# Patient Record
Sex: FEMALE | Race: WHITE | Employment: STUDENT
[De-identification: names, ages, dates, MRNs, and addresses within clinical notes are randomized per-mention and may not be internally consistent; named-entity substitution may affect disease eponyms.]

---

## 2019-07-22 ENCOUNTER — RECORDS - HEALTHEAST (OUTPATIENT)
Dept: ADMINISTRATIVE | Facility: OTHER | Age: 24
End: 2019-07-22

## 2019-07-23 ENCOUNTER — RECORDS - HEALTHEAST (OUTPATIENT)
Dept: ADMINISTRATIVE | Facility: OTHER | Age: 24
End: 2019-07-23

## 2019-08-10 ENCOUNTER — RECORDS - HEALTHEAST (OUTPATIENT)
Dept: ADMINISTRATIVE | Facility: OTHER | Age: 24
End: 2019-08-10

## 2019-08-12 ENCOUNTER — OFFICE VISIT - HEALTHEAST (OUTPATIENT)
Dept: ALLERGY | Facility: CLINIC | Age: 24
End: 2019-08-12

## 2019-08-12 DIAGNOSIS — T78.3XXD ANGIOEDEMA, SUBSEQUENT ENCOUNTER: ICD-10-CM

## 2019-08-12 DIAGNOSIS — L50.9 HIVES: ICD-10-CM

## 2019-08-12 LAB — TSH SERPL DL<=0.005 MIU/L-ACNC: 1.12 UIU/ML (ref 0.3–5)

## 2019-08-12 RX ORDER — EPINEPHRINE 0.3 MG/.3ML
INJECTION SUBCUTANEOUS
Refills: 2 | Status: SHIPPED | COMMUNITY
Start: 2019-07-22

## 2019-08-12 ASSESSMENT — MIFFLIN-ST. JEOR: SCORE: 1493.85

## 2019-08-13 LAB — 25(OH)D3 SERPL-MCNC: 41.5 NG/ML (ref 30–80)

## 2019-08-14 LAB — ANA SER QL: 0.3 U

## 2019-08-16 LAB
C1INH ACT/NOR SERPL: 84 %
C1INH SERPL-MCNC: 27 MG/DL (ref 21–39)

## 2019-08-19 ENCOUNTER — COMMUNICATION - HEALTHEAST (OUTPATIENT)
Dept: ALLERGY | Facility: CLINIC | Age: 24
End: 2019-08-19

## 2020-05-08 ENCOUNTER — NURSE TRIAGE (OUTPATIENT)
Dept: NURSING | Facility: CLINIC | Age: 25
End: 2020-05-08

## 2020-05-08 DIAGNOSIS — Z11.59 SCREENING FOR VIRAL DISEASE: Primary | ICD-10-CM

## 2020-05-08 NOTE — TELEPHONE ENCOUNTER
"Triage call:     Patient is calling requesting COVID serologic antibody testing.  NOTE: Serologic testing is a blood test for 'antibodies' which are made at 10-14 days after you have had symptoms of COVID or were exposed and had an asymptomatic infection.  This does NOT test you for 'active' infection or tell you if you are contagious.    Are you a healthcare worker?  No  Do you have cough, fever, myalgias, or shortness of breath?  No  Were you exposed to a lab confirmed positive or possible case of COVID-19?  No exposure.    The patient was informed: \"Testing is limited each day and it may take time for testing to be available to everyone who has called.  We will be calling you to schedule testing- please confirm the best number to reach you is [phone].\"    Lab order placed per COVID Serologic Testing standing orders.    Radha Leon RN BSN 5/8/2020 1:36 PM          "

## 2020-05-15 DIAGNOSIS — Z11.59 SCREENING FOR VIRAL DISEASE: ICD-10-CM

## 2020-05-15 PROCEDURE — 86769 SARS-COV-2 COVID-19 ANTIBODY: CPT | Mod: 90 | Performed by: EMERGENCY MEDICINE

## 2020-05-15 PROCEDURE — 99000 SPECIMEN HANDLING OFFICE-LAB: CPT | Performed by: EMERGENCY MEDICINE

## 2020-05-15 PROCEDURE — 36415 COLL VENOUS BLD VENIPUNCTURE: CPT | Performed by: EMERGENCY MEDICINE

## 2020-05-18 LAB
COVID-19 SPIKE RBD ABY TITER: NORMAL
COVID-19 SPIKE RBD ABY: NEGATIVE

## 2020-05-26 ENCOUNTER — NURSE TRIAGE (OUTPATIENT)
Dept: NURSING | Facility: CLINIC | Age: 25
End: 2020-05-26

## 2020-05-26 NOTE — TELEPHONE ENCOUNTER
Call from Mom looking for recent antibody testing for COVID19  Received verbal from patient to speak with mom  Upon review of chart, notified of negative result with letter being sent out.  Melissa Garcia RN  [facility] Nurse Advisor      Coronavirus (COVID-19) Notification    Lab Result   Lab test 2019-nCoV rRt-PCR OR SARS-COV-2 PCR    Nasopharyngeal AND/OR Oropharyngeal swab is NEGATIVE for 2019-nCoV RNA [OR] SARS-COV-2 RNA (COVID-19) RNA    Your result was negative. This means that we didn't find the virus that causes COVID-19 in your sample.   A test may show negative when you do actually have the virus. This can happen when the virus is in the early stages of infection, before you feel illness symptoms.    Even if you don't have symptoms, they may still appear. For safety, it's very important to follow these rules.  Keep yourself away from others (self-isolation):    Stay home. Don't go to work, school or anywhere else.     Stay in your own room (and use your own bathroom), if you can.    Stay away from others in your home. No hugging, kissing or shaking hands. No visitors.    Clean  high touch  surfaces often (doorknobs, counters, handles, etc.). Use a household cleaning spray or wipes.    Cover your mouth and nose with a mask, tissue or washcloth to avoid spreading germs.    Wash your hands and face often with soap and water.    Stay in self-isolation until you meet ALL of the guidelines below:  1. You have had no fever for at least 72 hours (that is 3 full days of no fever without the use of medicine that reduces fevers), AND  2. other symptoms (such as cough, shortness of breath) have gotten better, AND  3. at least 10 days have passed since your symptoms first appeared.    Going back to work  Check with your employer for any guidelines to follow for going back to work.  Employers: This document serves as formal notice that your employee tested negative for COVID-19, as of the testing date shown  above.    If your symptoms worsen or other concerning symptoms, contact PCP, oncare or consider returning to Emergency Dept.    For questions regarding COVID19, Patient can access more information at:    https://www.cdc.gov/coronavirus/2019-ncov/faq.html    [email or web address]/coronavirus/index.html    [facility] Hotline (705-487-0934)    {Name]  Additional Information    Health Information question, no triage required and triager able to answer question    Protocols used: INFORMATION ONLY CALL-A-AH

## 2021-05-31 NOTE — TELEPHONE ENCOUNTER
----- Message from Annalise Astorga MD sent at 8/19/2019  7:41 AM CDT -----  Let patient know that all testing returned normal.    LM and let her know we mailed it.

## 2021-05-31 NOTE — PROGRESS NOTES
Chief complaint: Allergic reaction    History of present illness: This is a pleasant 24-year-old woman here today for evaluation of allergic reaction.  She is accompanied today by her mother.  She reports on July 3, she was at a restaurant.  She had a glass of wine, coconut shrimp and a halibut dish.  She states that upon eating this food, she developed itching of her face.  She states she also had joe salsa plus avocado.  She thought maybe she had some hives.  She did not think much of it as she has had random episodes of hives in the past.  She states the next morning she woke up and her lips were swollen.  She felt that her tongue was sore.  She took Claritin and symptoms quickly resolved.  She then reports on July 16, she had PRICILA blueberry beverage and chicken pad dawn with rice noodles.  She then reported that she felt itching of her face and her teeth.  She took another Claritin but woke up the next morning and noticed upper and lower lip swelling.  Her tongue was sore.  She took another Claritin and symptoms got better.  She has eaten fish, peanuts, eggs, brown rice sesame and chicken since that time without incident.  She was seen at [facility].  Several food allergy tests were done which were all negative.  She was told to carry an epinephrine device and add back foods one at a time.  Latex containing foods were also negative given that she has a latex allergy.  She then reports she traveled to [facility].  There she was eating another dish was a pizza with tomato sauce.  She had a glass of wine.  She felt that her lips were swelling again.  She took an Allegra and symptoms quickly resolved.  She felt this time that her throat was also tight.  She reports that the lip swelling took some time to go down but the throat tightness resolved almost immediately upon taking the medication.  She denies any illness with any of these episodes.  She states she has been evaluated recently for irritable bowel syndrome.   "She had several laboratory tests done in regards to this.  I was able to review these.  All tests were appeared normal.  I did have about 20 pages of outside records to review from [facility] allergy as well.  Of note, she had an IUD placed a few months prior to this first reaction.  Previously she had been on an oral contraceptive.  She is leaving tomorrow to go to [facility].  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       Current Outpatient Medications:      EPINEPHrine (EPIPEN/ADRENACLICK/AUVI-Q) 0.3 mg/0.3 mL injection, INJECT INTRAMUSCULARLY INTO THIGH AND HOLD FOR 10 SECONDS., Disp: , Rfl: 2     hyoscyamine (LEVSIN/SL) 0.125 mg SL tablet, Place 0.125 mg under the tongue., Disp: , Rfl:     Allergies   Allergen Reactions     Penicillins Other (See Comments)       BP 98/62   Pulse 68   Ht 5' 7\" (1.702 m)   Wt 159 lb (72.1 kg)   BMI 24.90 kg/m    Gen: Pleasant female not in acute distress  HEENT: Eyes no erythema of the bulbar or palpebral conjunctiva, no edema. Mouth: Throat clear, no lip or tongue edema.     Skin: Dermatographia  Psych: Alert and oriented times 3    Impression report and plan:  1.  Angioedema  2.  Urticaria    I suspect this is actually chronic urticaria and angioedema.  She had several episodes of unexplained hives over her lifetime.  She now has angioedema.  I think checking a tryptase level after reaction is a good idea but I am less suspicious that this is a true IgE mediated food allergy.  I suggested taking the Claritin daily as this may calm some of her anxiety regarding these episodes.  I suspect she has a component of " vocal cord dysfunction leading to some of her throat symptoms as well.  I did review some vocal cord dysfunction exercises.  Okay to carry epinephrine for now but I suspect she will not need to use this.  I did ask her to have an CITLALLI, TSH and a vitamin D level checked.  I was able to review her other records from [place].  I suggested a C1 esterase inhibitor quantitative and functional level, however, this would likely not be histamine responsive.  Upon reaction, recommend 50 mill grams of Benadryl.  Could check 24-hour urine for mast cell mediators as well.  Follow as needed.    Time spent with the patient, 30 minutes, greater than half spent counseling and coordination of care regarding angioedema.

## 2021-05-31 NOTE — PATIENT INSTRUCTIONS - HE
Tryptase during attack    Claritin (loratidine) 10 mg daily --can increase dose if needed    Benadryl (diphenhydramine) 50 mg during attack    Watch triggers    Chronic urticaria and angioedema    Vocal Cord Dysfunction    Diaphragm breathing    Breathe through drinking straw    Paused breathing (pant)

## 2021-06-03 VITALS — BODY MASS INDEX: 24.96 KG/M2 | WEIGHT: 159 LBS | HEIGHT: 67 IN

## 2021-06-19 NOTE — LETTER
Letter by Annalise Astorga MD at      Author: Annalise Astorga MD Service: -- Author Type: --    Filed:  Encounter Date: 8/19/2019 Status: (Other)         Jennifer Guerrero  [address] [facility] [address]             August 19, 2019         Dear Ms. Guerrero,    Below are the results from your recent visit:    Resulted Orders   CITLALLI   Result Value Ref Range    CITLALLI Screen Cascade 0.3 <=2.9 U    Narrative    <1.0 negative  1.1-2.9 weakly positive  3.0-5.9 positive ( reflex)  > or=6.0 strongly positive   TSH   Result Value Ref Range    TSH 1.12 0.30 - 5.00 uIU/mL   Vitamin D, Total (25-Hydroxy)   Result Value Ref Range    Vitamin D, Total (25-Hydroxy) 41.5 30.0 - 80.0 ng/mL    Narrative    Deficiency <10.0 ng/mL  Insufficiency 10.0-29.9 ng/mL  Sufficiency 30.0-80.0 ng/mL  Toxicity (possible) >100.0 ng/mL   C1 Esterase Inhibitor, Functional Assay   Result Value Ref Range    C-1-Esterase Inhib. Functional 84 >=41 %      Comment:      INTERPRETIVE INFORMATION: C-1-Esterase Inhib. Functional      68% or greater ........ Normal    41% - 67% ............. Indeterminate    40% or less ........... Abnormal  Performed by [facility],  [address] [place] [address] [phone]  [email or web address] Cristopher Coe MD, Lab. Director   C1-Esterase Inhibitor   Result Value Ref Range    C-1-Esterase Inhibitor 27 21 - 39 mg/dL      Comment:      Performed by [facility],  [address] [facility] [place] [address] [phone]  [email or web address] Cristopher Coe MD, Lab. Director     Testing returned normal.       Please call with questions or contact us using [employer].    Sincerely,        Electronically signed by Annalise Astorga MD